# Patient Record
Sex: MALE | Race: AMERICAN INDIAN OR ALASKA NATIVE | NOT HISPANIC OR LATINO | Employment: UNEMPLOYED | URBAN - NONMETROPOLITAN AREA
[De-identification: names, ages, dates, MRNs, and addresses within clinical notes are randomized per-mention and may not be internally consistent; named-entity substitution may affect disease eponyms.]

---

## 2021-08-10 ENCOUNTER — HOSPITAL ENCOUNTER (EMERGENCY)
Facility: OTHER | Age: 31
Discharge: OTHER INSTITUTION | End: 2021-08-11
Attending: STUDENT IN AN ORGANIZED HEALTH CARE EDUCATION/TRAINING PROGRAM | Admitting: STUDENT IN AN ORGANIZED HEALTH CARE EDUCATION/TRAINING PROGRAM
Payer: COMMERCIAL

## 2021-08-10 DIAGNOSIS — F10.930 ALCOHOL WITHDRAWAL SYNDROME WITHOUT COMPLICATION (H): ICD-10-CM

## 2021-08-10 LAB
ALBUMIN SERPL-MCNC: 3.8 G/DL (ref 3.5–5.7)
ALP SERPL-CCNC: 122 U/L (ref 34–104)
ALT SERPL W P-5'-P-CCNC: 182 U/L (ref 7–52)
ANION GAP SERPL CALCULATED.3IONS-SCNC: 12 MMOL/L (ref 3–14)
AST SERPL W P-5'-P-CCNC: 143 U/L (ref 13–39)
BASOPHILS # BLD AUTO: 0.1 10E3/UL (ref 0–0.2)
BASOPHILS NFR BLD AUTO: 1 %
BILIRUB SERPL-MCNC: 0.7 MG/DL (ref 0.3–1)
BUN SERPL-MCNC: 11 MG/DL (ref 7–25)
CALCIUM SERPL-MCNC: 9.3 MG/DL (ref 8.6–10.3)
CHLORIDE BLD-SCNC: 99 MMOL/L (ref 98–107)
CO2 SERPL-SCNC: 22 MMOL/L (ref 21–31)
CREAT SERPL-MCNC: 0.84 MG/DL (ref 0.7–1.3)
EOSINOPHIL # BLD AUTO: 0.4 10E3/UL (ref 0–0.7)
EOSINOPHIL NFR BLD AUTO: 4 %
ERYTHROCYTE [DISTWIDTH] IN BLOOD BY AUTOMATED COUNT: 11.7 % (ref 10–15)
ETHANOL SERPL-MCNC: <0.01 G/DL
GFR SERPL CREATININE-BSD FRML MDRD: >90 ML/MIN/1.73M2
GLUCOSE BLD-MCNC: 106 MG/DL (ref 70–105)
HCT VFR BLD AUTO: 42.6 % (ref 40–53)
HGB BLD-MCNC: 15.3 G/DL (ref 13.3–17.7)
IMM GRANULOCYTES # BLD: 0 10E3/UL
IMM GRANULOCYTES NFR BLD: 0 %
INR PPP: 0.91 (ref 0.85–1.15)
LACTATE SERPL-SCNC: 0.9 MMOL/L (ref 0.7–2)
LIPASE SERPL-CCNC: 21 U/L (ref 11–82)
LYMPHOCYTES # BLD AUTO: 2.1 10E3/UL (ref 0.8–5.3)
LYMPHOCYTES NFR BLD AUTO: 19 %
MAGNESIUM SERPL-MCNC: 2 MG/DL (ref 1.9–2.7)
MCH RBC QN AUTO: 34 PG (ref 26.5–33)
MCHC RBC AUTO-ENTMCNC: 35.9 G/DL (ref 31.5–36.5)
MCV RBC AUTO: 95 FL (ref 78–100)
MONOCYTES # BLD AUTO: 0.6 10E3/UL (ref 0–1.3)
MONOCYTES NFR BLD AUTO: 6 %
NEUTROPHILS # BLD AUTO: 8.1 10E3/UL (ref 1.6–8.3)
NEUTROPHILS NFR BLD AUTO: 70 %
NRBC # BLD AUTO: 0 10E3/UL
NRBC BLD AUTO-RTO: 0 /100
PLATELET # BLD AUTO: 311 10E3/UL (ref 150–450)
POTASSIUM BLD-SCNC: 3.6 MMOL/L (ref 3.5–5.1)
PROT SERPL-MCNC: 7.1 G/DL (ref 6.4–8.9)
RBC # BLD AUTO: 4.5 10E6/UL (ref 4.4–5.9)
SODIUM SERPL-SCNC: 133 MMOL/L (ref 134–144)
WBC # BLD AUTO: 11.3 10E3/UL (ref 4–11)

## 2021-08-10 PROCEDURE — 96374 THER/PROPH/DIAG INJ IV PUSH: CPT | Performed by: STUDENT IN AN ORGANIZED HEALTH CARE EDUCATION/TRAINING PROGRAM

## 2021-08-10 PROCEDURE — 250N000013 HC RX MED GY IP 250 OP 250 PS 637: Performed by: STUDENT IN AN ORGANIZED HEALTH CARE EDUCATION/TRAINING PROGRAM

## 2021-08-10 PROCEDURE — 83605 ASSAY OF LACTIC ACID: CPT | Performed by: STUDENT IN AN ORGANIZED HEALTH CARE EDUCATION/TRAINING PROGRAM

## 2021-08-10 PROCEDURE — 93005 ELECTROCARDIOGRAM TRACING: CPT | Performed by: STUDENT IN AN ORGANIZED HEALTH CARE EDUCATION/TRAINING PROGRAM

## 2021-08-10 PROCEDURE — 83690 ASSAY OF LIPASE: CPT | Performed by: STUDENT IN AN ORGANIZED HEALTH CARE EDUCATION/TRAINING PROGRAM

## 2021-08-10 PROCEDURE — 36415 COLL VENOUS BLD VENIPUNCTURE: CPT | Performed by: STUDENT IN AN ORGANIZED HEALTH CARE EDUCATION/TRAINING PROGRAM

## 2021-08-10 PROCEDURE — 96375 TX/PRO/DX INJ NEW DRUG ADDON: CPT | Performed by: STUDENT IN AN ORGANIZED HEALTH CARE EDUCATION/TRAINING PROGRAM

## 2021-08-10 PROCEDURE — 99284 EMERGENCY DEPT VISIT MOD MDM: CPT | Performed by: STUDENT IN AN ORGANIZED HEALTH CARE EDUCATION/TRAINING PROGRAM

## 2021-08-10 PROCEDURE — 82040 ASSAY OF SERUM ALBUMIN: CPT | Performed by: STUDENT IN AN ORGANIZED HEALTH CARE EDUCATION/TRAINING PROGRAM

## 2021-08-10 PROCEDURE — 83735 ASSAY OF MAGNESIUM: CPT | Performed by: STUDENT IN AN ORGANIZED HEALTH CARE EDUCATION/TRAINING PROGRAM

## 2021-08-10 PROCEDURE — 93010 ELECTROCARDIOGRAM REPORT: CPT | Performed by: INTERNAL MEDICINE

## 2021-08-10 PROCEDURE — 250N000011 HC RX IP 250 OP 636: Performed by: STUDENT IN AN ORGANIZED HEALTH CARE EDUCATION/TRAINING PROGRAM

## 2021-08-10 PROCEDURE — 258N000003 HC RX IP 258 OP 636: Performed by: STUDENT IN AN ORGANIZED HEALTH CARE EDUCATION/TRAINING PROGRAM

## 2021-08-10 PROCEDURE — 85025 COMPLETE CBC W/AUTO DIFF WBC: CPT | Performed by: STUDENT IN AN ORGANIZED HEALTH CARE EDUCATION/TRAINING PROGRAM

## 2021-08-10 PROCEDURE — 85610 PROTHROMBIN TIME: CPT | Performed by: STUDENT IN AN ORGANIZED HEALTH CARE EDUCATION/TRAINING PROGRAM

## 2021-08-10 PROCEDURE — 82077 ASSAY SPEC XCP UR&BREATH IA: CPT | Performed by: STUDENT IN AN ORGANIZED HEALTH CARE EDUCATION/TRAINING PROGRAM

## 2021-08-10 PROCEDURE — 99285 EMERGENCY DEPT VISIT HI MDM: CPT | Mod: 25 | Performed by: STUDENT IN AN ORGANIZED HEALTH CARE EDUCATION/TRAINING PROGRAM

## 2021-08-10 RX ORDER — DIAZEPAM 5 MG
10 TABLET ORAL ONCE
Status: COMPLETED | OUTPATIENT
Start: 2021-08-10 | End: 2021-08-10

## 2021-08-10 RX ORDER — THIAMINE HYDROCHLORIDE 100 MG/ML
100 INJECTION, SOLUTION INTRAMUSCULAR; INTRAVENOUS ONCE
Status: COMPLETED | OUTPATIENT
Start: 2021-08-10 | End: 2021-08-10

## 2021-08-10 RX ORDER — FOLIC ACID 5 MG/ML
1 INJECTION, SOLUTION INTRAMUSCULAR; INTRAVENOUS; SUBCUTANEOUS ONCE
Status: DISCONTINUED | OUTPATIENT
Start: 2021-08-10 | End: 2021-08-10 | Stop reason: CLARIF

## 2021-08-10 RX ORDER — FOLIC ACID 1 MG/1
1 TABLET ORAL DAILY
Status: DISCONTINUED | OUTPATIENT
Start: 2021-08-11 | End: 2021-08-11 | Stop reason: HOSPADM

## 2021-08-10 RX ORDER — ONDANSETRON 2 MG/ML
4 INJECTION INTRAMUSCULAR; INTRAVENOUS ONCE
Status: COMPLETED | OUTPATIENT
Start: 2021-08-10 | End: 2021-08-10

## 2021-08-10 RX ADMIN — DIAZEPAM 10 MG: 5 TABLET ORAL at 23:40

## 2021-08-10 RX ADMIN — THIAMINE HYDROCHLORIDE 100 MG: 100 INJECTION, SOLUTION INTRAMUSCULAR; INTRAVENOUS at 23:40

## 2021-08-10 RX ADMIN — ONDANSETRON 4 MG: 2 INJECTION INTRAMUSCULAR; INTRAVENOUS at 23:40

## 2021-08-10 RX ADMIN — FOLIC ACID 1 MG: 1 TABLET ORAL at 23:41

## 2021-08-10 RX ADMIN — SODIUM CHLORIDE 1000 ML: 9 INJECTION, SOLUTION INTRAVENOUS at 23:40

## 2021-08-11 ENCOUNTER — ALLIED HEALTH/NURSE VISIT (OUTPATIENT)
Dept: FAMILY MEDICINE | Facility: OTHER | Age: 31
End: 2021-08-11
Attending: FAMILY MEDICINE
Payer: COMMERCIAL

## 2021-08-11 VITALS
WEIGHT: 200 LBS | DIASTOLIC BLOOD PRESSURE: 105 MMHG | SYSTOLIC BLOOD PRESSURE: 155 MMHG | TEMPERATURE: 98 F | HEART RATE: 90 BPM | RESPIRATION RATE: 18 BRPM | OXYGEN SATURATION: 96 %

## 2021-08-11 DIAGNOSIS — Z20.822 COVID-19 RULED OUT: Primary | ICD-10-CM

## 2021-08-11 LAB
HOLD SPECIMEN: NORMAL
HOLD SPECIMEN: NORMAL
SARS-COV-2 RNA RESP QL NAA+PROBE: NEGATIVE

## 2021-08-11 PROCEDURE — C9803 HOPD COVID-19 SPEC COLLECT: HCPCS

## 2021-08-11 PROCEDURE — U0003 INFECTIOUS AGENT DETECTION BY NUCLEIC ACID (DNA OR RNA); SEVERE ACUTE RESPIRATORY SYNDROME CORONAVIRUS 2 (SARS-COV-2) (CORONAVIRUS DISEASE [COVID-19]), AMPLIFIED PROBE TECHNIQUE, MAKING USE OF HIGH THROUGHPUT TECHNOLOGIES AS DESCRIBED BY CMS-2020-01-R: HCPCS | Mod: ZL

## 2021-08-11 NOTE — PROGRESS NOTES
Patient swabbed for COVID-19 testing.  Alisia Mccoy LPN on 8/11/2021 at 11:30 AM    [facility]

## 2021-08-11 NOTE — ED PROVIDER NOTES
History     Chief Complaint   Patient presents with     Hypertension     Headache     Nausea     HPI    Nick Lu is a 31 year old male with etoh use disorder, history of alcohol withdrawal seizures who presents with high blood pressure.  Patient states that he is currently at detox and that staff at the detox center are concerned about his high blood pressure which did not seem to get better with Ativan and they are concerned about second causes, per patient.  The patient does not feel like he is in alcohol withdrawal, he has been in alcohol withdrawal in the past and had alcohol withdrawal seizures and certainly this does not feel similar to those severe events in the past.  He states he may have mild alcohol withdrawal, but the Ativan has been helping at the facility.  He denies any fevers, chills, chest pain, shortness breath, sore throat, cough, pain with writing, myalgias, arthralgias, or blurry vision.  He does have a mild headache, diffuse and slowly progressive onset in nature.  He does smoke marijuana on occasion, there is no new marijuana or synthetic or adulterated marijuana. Denies SI/HI.    He states that he has treatment starting this Thursday for his ETOH use in [place], [place].    Allergies:  No Known Allergies    Problem List:    There are no problems to display for this patient.       Past Medical History:    History reviewed. No pertinent past medical history.    Past Surgical History:    History reviewed. No pertinent surgical history.    Family History:    History reviewed. No pertinent family history.    Social History:  Marital Status:  Single [1]  Social History     Tobacco Use     Smoking status: Current Every Day Smoker     Packs/day: 1.00     Types: Cigarettes     Smokeless tobacco: Never Used   Substance Use Topics     Alcohol use: Yes     Comment: last sunday night     Drug use: None        Medications:    No current outpatient medications on file.        Review of Systems  All systems  reviewed and other than pertinent positives and negatives in HPI, all other systems are negaive      Physical Exam   BP: (!) 149/104  Pulse: (!) 121  Temp: 98  F (36.7  C)  Resp: 18  Weight: 90.7 kg (200 lb)  SpO2: 96 %    Vitals: BP (!) 157/108   Pulse (!) 121   Temp 98  F (36.7  C) (Tympanic)   Resp 18   Wt 90.7 kg (200 lb)   SpO2 96%   Constitutional: awake, NAD  Eyes: No conjunctival injection and normal lids, PERRL, EOMI  ENT: external nose and ears atraumatic  - dry oral mucous membranes  - no tongue fasciulations  Neck: Symmetric, trachea midline, Supple  CV: RRR, no murmurs appreciated.  Pulm: Unlabored respiratory effort, good air movement, CTAB, no w/c/r appreciated.  GI: Soft, nontender, nondistended.  No rebound or gaurding.  MSK: No deformities.  No cyanosis.  Neuro: AOx4, normal speech, following commands, grossly symmetric strength in all extremities.  Cranial nerves III-XII grossly intact.   - no tremors  Skin: warm & dry, no rashes or lesions  Psych: Appropriate mood & affect.      Assessments & Plan (with Medical Decision Making)     Patient is a 31-year-old male with a past medical history of severe alcohol use disorder, alcohol withdrawal, alcohol withdrawal seizures, marijuana use who presents with high blood pressure.  He is currently in a detox facility receiving benzodiazepine taper treatment, concerned about his high blood pressures at the facility despite giving Ativan.  He reports smoking marijuana, but denies any other illicit drugs.  He states he has had some loose stools, nonbloody, but denies any URI symptoms, fevers, chills, sore throat,hallucinations, or  suspicious foods.    ED Course  ED Course as of Aug 11 0015   Tue Aug 10, 2021   2220 BP(!): 149/104   2220 Temp: 98  F (36.7  C)   2220 Pulse(!): 121   2220 Resp: 18   2220 SpO2: 96 %   2319 WBC(!): 11.3   2319 Hemoglobin: 15.3   2319 Platelet Count: 311   2342 INR: 0.91   2342 ALT(!): 182   2342 AST(!): 143   2342 Alkaline  Phosphatase(!): 122   2342 Glucose(!): 106   2342 Creatinine: 0.84   2342 Chloride: 99   2342 Sodium(!): 133   2342 Bilirubin Total: 0.7   2343 CT abdomen pelvis at OSH 8/9/2021    IMPRESSION:   1. No acute findings in the abdomen or pelvis.   2.  Diffuse fatty infiltration of the liver. Correlate clinically as this may   predispose patient to developing steatohepatitis and eventual cirrhosis.   3. Fatty infiltration of the colonic wall involving the cecum through hepatic   flexure.  Nonspecific but is associated with prior infection or inflammation   and can also be seen in the setting of quiescent inflammatory bowel disease.   4. Mild right basilar atelectasis.        2343 OSH labs  - ,   - alkphos 149      Wed Aug 11, 2021   0009 Ethanol g/dL: <0.01   0015 BP(!): 152/111   0015 Pulse: 99       /104. . Temp 98F.  RR 18.  SpO2 96%. Secondary exam revealed slightly anxious but nontoxic-appearing male.  He has nontender abdomen, no tongue fasciculations or tremors of his hands.  He does have dry oral mucous membranes.  States that he does not feel like he is going to withdrawal at this time, but it could be possible that he is in mild withdrawal.  Does endorse smoking marijuana denies any illicit drug use.  He was given fluids, Valium, Zofran, thiamine and folate.  His white blood cell count 11.3.  Hemoglobin 15.3.  AST/ALT elevated at 143/182 but decreased from yesterday which is good.  INR 0.91.  Alk phos 122, decreased from yesterday at 149.  Alcohol level <0.01.  Sodium 133.  Potassium 3.6.  Creatinine 0.84.  Magnesium 2.0.  Lipase 21.  He was observed for period time and had improvement of symptoms.  My concern for sinister pathology such as small bowel obstruction, return to radius, volvulus, mesenteric ischemia, intracranial pathology such as ICH, or subarachnoid hemorrhage are low.  His headache was slowly progressive in nature.  Certainly his symptoms are consistent with mild to  moderate alcohol withdrawal today improved with Valium 10 mg. Certainly, he does have some mild diarrhea which could be contributing to his tachycardia as well. Per EMR, his covid19 test was negative yesterday and thus I do not believe it indicated for repeating testing today. Tachycardia improved to 99. Likely combination of hypovolemia and mild etoh withdrawal.   Given this he was told encouraged to go back to the detox facility which she was amenable to.  All questions answered.     Per chart review, he was last seen yesterday at [facility] found to have ETOH withdrawal given ativan, zofran and fluids.  CT abdomen completed then to rule out obstruction which was negative.     Plan  - NS 1L  - valium 10 mg PO  - zofran 4 mg IV  - thiamine 100 mg IV  - folate 1 mg  - stable to return to detox  - Patient was told to follow up with their PCP in 2-3 days.  - Return to the emergency department if you have any worsening or concerning symptoms, or call 911.    Impression  Marijuana Use  Severe alcohol use disorder  Alcohol withdrawal syndrome, mild  Diarrhea, mild    Disposition  detox    I have reviewed the nursing notes.    I have reviewed the findings, diagnosis, plan and need for follow up with the patient.  Nikolas Badillo MD  Emergency Medicine   8/10/2021   [facility]     Yoni Badillo MD  08/11/21 0012       Yoni Badillo MD  08/11/21 0015

## 2021-08-11 NOTE — ED TRIAGE NOTES
ED Nursing Triage Note (General)   ________________________________    Nick Lu is a 31 year old Male that presents to triage private car with history of hypertension, headache and tremors reported by patient   Significant symptoms had onset 2 hour(s) ago. Patient is currently at detox as of Monday AM for alcohol. Found to be hypertensive upon check at detox. Feels mild tremors and headache. Patient would like to return to detox when able.   BP (!) 149/104   Pulse (!) 121   Temp 98  F (36.7  C) (Tympanic)   Resp 18   Wt 90.7 kg (200 lb)   SpO2 96%   Patient appears alert , in no acute distress  GCS Eye Opening = 4=Spontaneous  Airway: intact  Breathing noted as Normal  Circulation Normal  Skin:  Normal  Action taken: [place] 10      PRE HOSPITAL PRIOR LIVING SITUATION Alone

## 2021-08-13 LAB
ATRIAL RATE - MUSE: 99 BPM
DIASTOLIC BLOOD PRESSURE - MUSE: NORMAL MMHG
INTERPRETATION ECG - MUSE: NORMAL
P AXIS - MUSE: 21 DEGREES
PR INTERVAL - MUSE: 130 MS
QRS DURATION - MUSE: 80 MS
QT - MUSE: 350 MS
QTC - MUSE: 449 MS
R AXIS - MUSE: -11 DEGREES
SYSTOLIC BLOOD PRESSURE - MUSE: NORMAL MMHG
T AXIS - MUSE: 12 DEGREES
VENTRICULAR RATE- MUSE: 99 BPM

## 2023-01-11 ENCOUNTER — HOSPITAL ENCOUNTER (EMERGENCY)
Facility: OTHER | Age: 33
Discharge: OTHER INSTITUTION | End: 2023-01-11
Attending: FAMILY MEDICINE | Admitting: FAMILY MEDICINE
Payer: COMMERCIAL

## 2023-01-11 VITALS
DIASTOLIC BLOOD PRESSURE: 93 MMHG | OXYGEN SATURATION: 97 % | SYSTOLIC BLOOD PRESSURE: 131 MMHG | RESPIRATION RATE: 12 BRPM | HEART RATE: 105 BPM | TEMPERATURE: 97.5 F

## 2023-01-11 DIAGNOSIS — R10.13 ABDOMINAL PAIN, EPIGASTRIC: ICD-10-CM

## 2023-01-11 DIAGNOSIS — F10.221 ACUTE ALCOHOL INTOXICATION WITH ALCOHOLISM WITH DELIRIUM (H): Primary | ICD-10-CM

## 2023-01-11 PROBLEM — B37.81 CANDIDA ESOPHAGITIS (H): Status: ACTIVE | Noted: 2020-06-23

## 2023-01-11 PROBLEM — E87.1 HYPONATREMIA: Status: ACTIVE | Noted: 2020-06-23

## 2023-01-11 PROBLEM — S83.211A BUCKET HANDLE TEAR OF MEDIAL MENISCUS OF RIGHT KNEE: Status: ACTIVE | Noted: 2017-11-14

## 2023-01-11 PROBLEM — R44.1 VISUAL HALLUCINATION: Status: ACTIVE | Noted: 2022-09-20

## 2023-01-11 PROBLEM — Z86.59 HISTORY OF SEIZURE DUE TO ALCOHOL WITHDRAWAL: Status: ACTIVE | Noted: 2022-09-20

## 2023-01-11 PROBLEM — R56.9 ALCOHOL WITHDRAWAL SEIZURE WITHOUT COMPLICATION (H): Status: ACTIVE | Noted: 2022-03-31

## 2023-01-11 PROBLEM — F10.930 ALCOHOL WITHDRAWAL SEIZURE WITHOUT COMPLICATION (H): Status: ACTIVE | Noted: 2022-03-31

## 2023-01-11 PROBLEM — K70.9 ALCOHOLIC LIVER DISEASE (H): Status: ACTIVE | Noted: 2022-03-31

## 2023-01-11 PROBLEM — K44.9 HIATAL HERNIA: Status: ACTIVE | Noted: 2020-06-16

## 2023-01-11 PROBLEM — F41.9 ANXIETY: Status: ACTIVE | Noted: 2022-04-23

## 2023-01-11 PROBLEM — Z87.898 HISTORY OF SEIZURE DUE TO ALCOHOL WITHDRAWAL: Status: ACTIVE | Noted: 2022-09-20

## 2023-01-11 PROBLEM — F10.932 ALCOHOL WITHDRAWAL SYNDROME WITH PERCEPTUAL DISTURBANCE (H): Status: ACTIVE | Noted: 2020-06-23

## 2023-01-11 PROBLEM — R79.89 ELEVATED LFTS: Status: ACTIVE | Noted: 2020-06-15

## 2023-01-11 PROBLEM — F32.9 MAJOR DEPRESSIVE DISORDER: Status: ACTIVE | Noted: 2022-04-23

## 2023-01-11 LAB
AMPHETAMINES UR QL SCN: NORMAL
ANION GAP SERPL CALCULATED.3IONS-SCNC: 16 MMOL/L (ref 7–15)
ATRIAL RATE - MUSE: 104 BPM
BARBITURATES UR QL SCN: NORMAL
BASOPHILS # BLD AUTO: 0.1 10E3/UL (ref 0–0.2)
BASOPHILS NFR BLD AUTO: 1 %
BENZODIAZ UR QL SCN: NORMAL
BUN SERPL-MCNC: 5.4 MG/DL (ref 6–20)
BZE UR QL SCN: NORMAL
CALCIUM SERPL-MCNC: 8.3 MG/DL (ref 8.6–10)
CANNABINOIDS UR QL SCN: NORMAL
CHLORIDE SERPL-SCNC: 97 MMOL/L (ref 98–107)
CREAT SERPL-MCNC: 0.72 MG/DL (ref 0.67–1.17)
DEPRECATED HCO3 PLAS-SCNC: 22 MMOL/L (ref 22–29)
DIASTOLIC BLOOD PRESSURE - MUSE: NORMAL MMHG
EOSINOPHIL # BLD AUTO: 0 10E3/UL (ref 0–0.7)
EOSINOPHIL NFR BLD AUTO: 0 %
ERYTHROCYTE [DISTWIDTH] IN BLOOD BY AUTOMATED COUNT: 14 % (ref 10–15)
ETHANOL SERPL-MCNC: 0.22 G/DL
GFR SERPL CREATININE-BSD FRML MDRD: >90 ML/MIN/1.73M2
GLUCOSE SERPL-MCNC: 113 MG/DL (ref 70–99)
HCT VFR BLD AUTO: 45.7 % (ref 40–53)
HGB BLD-MCNC: 16 G/DL (ref 13.3–17.7)
IMM GRANULOCYTES # BLD: 0 10E3/UL
IMM GRANULOCYTES NFR BLD: 0 %
INTERPRETATION ECG - MUSE: NORMAL
LIPASE SERPL-CCNC: 31 U/L (ref 13–60)
LYMPHOCYTES # BLD AUTO: 2.5 10E3/UL (ref 0.8–5.3)
LYMPHOCYTES NFR BLD AUTO: 27 %
MCH RBC QN AUTO: 31.3 PG (ref 26.5–33)
MCHC RBC AUTO-ENTMCNC: 35 G/DL (ref 31.5–36.5)
MCV RBC AUTO: 89 FL (ref 78–100)
MONOCYTES # BLD AUTO: 0.5 10E3/UL (ref 0–1.3)
MONOCYTES NFR BLD AUTO: 5 %
NEUTROPHILS # BLD AUTO: 6.4 10E3/UL (ref 1.6–8.3)
NEUTROPHILS NFR BLD AUTO: 67 %
NRBC # BLD AUTO: 0 10E3/UL
NRBC BLD AUTO-RTO: 0 /100
OPIATES UR QL SCN: NORMAL
P AXIS - MUSE: 30 DEGREES
PCP QUAL URINE (ROCHE): NORMAL
PLATELET # BLD AUTO: 338 10E3/UL (ref 150–450)
POTASSIUM SERPL-SCNC: 4 MMOL/L (ref 3.4–5.3)
PR INTERVAL - MUSE: 140 MS
QRS DURATION - MUSE: 88 MS
QT - MUSE: 340 MS
QTC - MUSE: 447 MS
R AXIS - MUSE: 5 DEGREES
RBC # BLD AUTO: 5.12 10E6/UL (ref 4.4–5.9)
SODIUM SERPL-SCNC: 135 MMOL/L (ref 136–145)
SYSTOLIC BLOOD PRESSURE - MUSE: NORMAL MMHG
T AXIS - MUSE: 29 DEGREES
TROPONIN T SERPL HS-MCNC: 9 NG/L
VENTRICULAR RATE- MUSE: 104 BPM
WBC # BLD AUTO: 9.5 10E3/UL (ref 4–11)

## 2023-01-11 PROCEDURE — 250N000013 HC RX MED GY IP 250 OP 250 PS 637: Performed by: STUDENT IN AN ORGANIZED HEALTH CARE EDUCATION/TRAINING PROGRAM

## 2023-01-11 PROCEDURE — 258N000003 HC RX IP 258 OP 636: Performed by: FAMILY MEDICINE

## 2023-01-11 PROCEDURE — 250N000011 HC RX IP 250 OP 636: Performed by: STUDENT IN AN ORGANIZED HEALTH CARE EDUCATION/TRAINING PROGRAM

## 2023-01-11 PROCEDURE — 250N000009 HC RX 250: Performed by: FAMILY MEDICINE

## 2023-01-11 PROCEDURE — 250N000011 HC RX IP 250 OP 636: Performed by: FAMILY MEDICINE

## 2023-01-11 PROCEDURE — 99285 EMERGENCY DEPT VISIT HI MDM: CPT | Mod: 25 | Performed by: FAMILY MEDICINE

## 2023-01-11 PROCEDURE — 96374 THER/PROPH/DIAG INJ IV PUSH: CPT | Performed by: FAMILY MEDICINE

## 2023-01-11 PROCEDURE — 80307 DRUG TEST PRSMV CHEM ANLYZR: CPT | Performed by: STUDENT IN AN ORGANIZED HEALTH CARE EDUCATION/TRAINING PROGRAM

## 2023-01-11 PROCEDURE — 96361 HYDRATE IV INFUSION ADD-ON: CPT | Performed by: FAMILY MEDICINE

## 2023-01-11 PROCEDURE — 80048 BASIC METABOLIC PNL TOTAL CA: CPT | Performed by: FAMILY MEDICINE

## 2023-01-11 PROCEDURE — 36415 COLL VENOUS BLD VENIPUNCTURE: CPT | Performed by: FAMILY MEDICINE

## 2023-01-11 PROCEDURE — 250N000013 HC RX MED GY IP 250 OP 250 PS 637: Performed by: FAMILY MEDICINE

## 2023-01-11 PROCEDURE — 85025 COMPLETE CBC W/AUTO DIFF WBC: CPT | Performed by: FAMILY MEDICINE

## 2023-01-11 PROCEDURE — 93005 ELECTROCARDIOGRAM TRACING: CPT | Performed by: FAMILY MEDICINE

## 2023-01-11 PROCEDURE — 84484 ASSAY OF TROPONIN QUANT: CPT | Performed by: FAMILY MEDICINE

## 2023-01-11 PROCEDURE — 93010 ELECTROCARDIOGRAM REPORT: CPT | Performed by: STUDENT IN AN ORGANIZED HEALTH CARE EDUCATION/TRAINING PROGRAM

## 2023-01-11 PROCEDURE — 82077 ASSAY SPEC XCP UR&BREATH IA: CPT | Performed by: STUDENT IN AN ORGANIZED HEALTH CARE EDUCATION/TRAINING PROGRAM

## 2023-01-11 PROCEDURE — 83690 ASSAY OF LIPASE: CPT | Performed by: FAMILY MEDICINE

## 2023-01-11 PROCEDURE — 99284 EMERGENCY DEPT VISIT MOD MDM: CPT | Performed by: FAMILY MEDICINE

## 2023-01-11 RX ORDER — OMEPRAZOLE 10 MG/1
20 CAPSULE, DELAYED RELEASE ORAL DAILY
COMMUNITY
End: 2023-01-11

## 2023-01-11 RX ORDER — LIDOCAINE HYDROCHLORIDE 20 MG/ML
15 SOLUTION OROPHARYNGEAL ONCE
Status: COMPLETED | OUTPATIENT
Start: 2023-01-11 | End: 2023-01-11

## 2023-01-11 RX ORDER — SODIUM CHLORIDE 9 MG/ML
INJECTION, SOLUTION INTRAVENOUS CONTINUOUS
Status: DISCONTINUED | OUTPATIENT
Start: 2023-01-11 | End: 2023-01-11 | Stop reason: HOSPADM

## 2023-01-11 RX ORDER — OMEPRAZOLE 10 MG/1
20 CAPSULE, DELAYED RELEASE ORAL 2 TIMES DAILY
Refills: 0 | COMMUNITY
Start: 2023-01-11

## 2023-01-11 RX ORDER — LORAZEPAM 0.5 MG/1
0.5 TABLET ORAL ONCE
Status: COMPLETED | OUTPATIENT
Start: 2023-01-11 | End: 2023-01-11

## 2023-01-11 RX ORDER — FAMOTIDINE 20 MG/1
20 TABLET, FILM COATED ORAL ONCE
Status: COMPLETED | OUTPATIENT
Start: 2023-01-11 | End: 2023-01-11

## 2023-01-11 RX ORDER — MAGNESIUM HYDROXIDE/ALUMINUM HYDROXICE/SIMETHICONE 120; 1200; 1200 MG/30ML; MG/30ML; MG/30ML
15 SUSPENSION ORAL ONCE
Status: COMPLETED | OUTPATIENT
Start: 2023-01-11 | End: 2023-01-11

## 2023-01-11 RX ORDER — ONDANSETRON 4 MG/1
4 TABLET, ORALLY DISINTEGRATING ORAL ONCE
Status: COMPLETED | OUTPATIENT
Start: 2023-01-11 | End: 2023-01-11

## 2023-01-11 RX ORDER — SUCRALFATE 1 G/1
1 TABLET ORAL 4 TIMES DAILY
COMMUNITY

## 2023-01-11 RX ORDER — LORAZEPAM 2 MG/ML
2 INJECTION INTRAMUSCULAR ONCE
Status: COMPLETED | OUTPATIENT
Start: 2023-01-11 | End: 2023-01-11

## 2023-01-11 RX ADMIN — LORAZEPAM 0.5 MG: 0.5 TABLET ORAL at 09:37

## 2023-01-11 RX ADMIN — ALUMINUM HYDROXIDE, MAGNESIUM HYDROXIDE, AND SIMETHICONE 15 ML: 200; 200; 20 SUSPENSION ORAL at 05:40

## 2023-01-11 RX ADMIN — LIDOCAINE HYDROCHLORIDE 15 ML: 20 SOLUTION ORAL at 05:40

## 2023-01-11 RX ADMIN — SODIUM CHLORIDE 1000 ML: 9 INJECTION, SOLUTION INTRAVENOUS at 06:46

## 2023-01-11 RX ADMIN — FAMOTIDINE 20 MG: 20 TABLET, FILM COATED ORAL at 05:39

## 2023-01-11 RX ADMIN — ONDANSETRON 4 MG: 4 TABLET, ORALLY DISINTEGRATING ORAL at 09:37

## 2023-01-11 RX ADMIN — LORAZEPAM 2 MG: 2 INJECTION INTRAMUSCULAR; INTRAVENOUS at 06:43

## 2023-01-11 ASSESSMENT — ENCOUNTER SYMPTOMS
PALPITATIONS: 0
CHILLS: 0
ABDOMINAL PAIN: 1

## 2023-01-11 ASSESSMENT — ACTIVITIES OF DAILY LIVING (ADL)
ADLS_ACUITY_SCORE: 35
ADLS_ACUITY_SCORE: 35

## 2023-01-11 NOTE — ED PROVIDER NOTES
Received signout at shift change. Patient presented from Detox for alcohol intoxication and abdominal pain burining in the epigastrium upward. Originates in epigastrium and proceeds upward. Episodes of emesis and dry heaving. History of acid reflux on omeprazole and sucralfate daily.     Labs with borderline low sodium. Normal lipase and troponin. CBC unremarkable. Add on ETOH level and Utox.     Ddx: alcohol intoxication ACS unlikely with negative troponin and EKG. Lipase negative but could be chronic. Hemodynamically stable with no blood in emesis so doubt ramila casas tear. Certainly with alcoholism he could have chronic gastritis.     Patient given GI cocktail which improved his pain short-term.  Ativan also helped.  On reassessment at 0900 patient is pleasant alert and having no hallucinations.  He still has some GI upset and has had no blood in his stool or his emesis.  We discussed him pursuing treatment for alcoholism again and he is in agreement with this.  Would like to quit drinking alcohol.  He will increase his PPI to twice daily and he will be transferred back to detox for ongoing monitoring of alcohol withdrawal.  Patient has urinated and is eating some toast prior to transfer.  He has been adequately hydrated prior to transfer and is taking good p.o. liquids.  Most recent CIWA score was negative.  He was administered a total of 2 g of IV lorazepam while in the ED.      ICD-10-CM    1. Acute alcohol intoxication with alcoholism with delirium (H)  F10.221       2. Abdominal pain, epigastric  R10.13         Bobo Bhakta MD on 1/11/2023 at 9:28 AM

## 2023-01-11 NOTE — ED PROVIDER NOTES
History     Chief Complaint   Patient presents with     Chest Pain     HPI  Nick Lu is a 32 year old male who presents to the emergency department from detox with pain in his upper abdomen.  History of pancreatitis and alcoholism.  No recent fevers.    Reviewed nurses notes below, similar history is related to me.  Patient presents to ER with CO chest pain from detox. Pain began 2 hours ago. Originates in epigastrium and proceeds upward. Patient burping multiple times during triage. Multiple emesis and dry heaving. Patient has history of acid reflux, omeprazole and sucralfate daily  Allergies:  No Known Allergies    Problem List:    There are no problems to display for this patient.       Past Medical History:    No past medical history on file.    Past Surgical History:    No past surgical history on file.    Family History:    No family history on file.    Social History:  Marital Status:  Single [1]  Social History     Tobacco Use     Smoking status: Every Day     Packs/day: 1.00     Types: Cigarettes     Smokeless tobacco: Never   Substance Use Topics     Alcohol use: Yes     Comment: last sunday night        Medications:    omeprazole (PRILOSEC) 10 MG DR capsule  sucralfate (CARAFATE) 1 GM tablet          Review of Systems   Constitutional: Negative for chills.   Cardiovascular: Positive for chest pain. Negative for palpitations and leg swelling.   Gastrointestinal: Positive for abdominal pain.       Physical Exam   BP: (!) 159/100  Pulse: 109  Temp: 97.5  F (36.4  C)  Resp: 20      Physical Exam  Vitals and nursing note reviewed.   Constitutional:       General: He is not in acute distress.     Appearance: He is not diaphoretic.   HENT:      Head: Atraumatic.   Eyes:      General: No scleral icterus.     Pupils: Pupils are equal, round, and reactive to light.   Cardiovascular:      Rate and Rhythm: Tachycardia present.      Heart sounds: Normal heart sounds.   Pulmonary:      Effort: No respiratory  distress.      Breath sounds: Normal breath sounds.   Abdominal:      General: Bowel sounds are normal.      Palpations: Abdomen is soft.      Tenderness: There is abdominal tenderness.   Musculoskeletal:         General: No tenderness.   Skin:     General: Skin is warm.      Findings: No rash.         ED Course                Results for orders placed or performed during the hospital encounter of 01/11/23 (from the past 24 hour(s))   CBC with platelets differential    Narrative    The following orders were created for panel order CBC with platelets differential.  Procedure                               Abnormality         Status                     ---------                               -----------         ------                     CBC with platelets and d...[[id number]]                      Final result                 Please view results for these tests on the individual orders.   Basic metabolic panel   Result Value Ref Range    Sodium 135 (L) 136 - 145 mmol/L    Potassium 4.0 3.4 - 5.3 mmol/L    Chloride 97 (L) 98 - 107 mmol/L    Carbon Dioxide (CO2) 22 22 - 29 mmol/L    Anion Gap 16 (H) 7 - 15 mmol/L    Urea Nitrogen 5.4 (L) 6.0 - 20.0 mg/dL    Creatinine 0.72 0.67 - 1.17 mg/dL    Calcium 8.3 (L) 8.6 - 10.0 mg/dL    Glucose 113 (H) 70 - 99 mg/dL    GFR Estimate >90 >60 mL/min/1.73m2   Lipase   Result Value Ref Range    Lipase 31 13 - 60 U/L   Troponin T, High Sensitivity   Result Value Ref Range    Troponin T, High Sensitivity 9 <=22 ng/L   CBC with platelets and differential   Result Value Ref Range    WBC Count 9.5 4.0 - 11.0 10e3/uL    RBC Count 5.12 4.40 - 5.90 10e6/uL    Hemoglobin 16.0 13.3 - 17.7 g/dL    Hematocrit 45.7 40.0 - 53.0 %    MCV 89 78 - 100 fL    MCH 31.3 26.5 - 33.0 pg    MCHC 35.0 31.5 - 36.5 g/dL    RDW 14.0 10.0 - 15.0 %    Platelet Count 338 150 - 450 10e3/uL    % Neutrophils 67 %    % Lymphocytes 27 %    % Monocytes 5 %    % Eosinophils 0 %    % Basophils 1 %    % Immature  Granulocytes 0 %    NRBCs per 100 WBC 0 <1 /100    Absolute Neutrophils 6.4 1.6 - 8.3 10e3/uL    Absolute Lymphocytes 2.5 0.8 - 5.3 10e3/uL    Absolute Monocytes 0.5 0.0 - 1.3 10e3/uL    Absolute Eosinophils 0.0 0.0 - 0.7 10e3/uL    Absolute Basophils 0.1 0.0 - 0.2 10e3/uL    Absolute Immature Granulocytes 0.0 <=0.4 10e3/uL    Absolute NRBCs 0.0 10e3/uL       Medications   0.9% sodium chloride BOLUS (has no administration in time range)     Followed by   sodium chloride 0.9% infusion (has no administration in time range)   LORazepam (ATIVAN) injection 2 mg (has no administration in time range)   alum & mag hydroxide-simethicone (MAALOX) suspension 15 mL (15 mLs Oral Given 1/11/23 2540)     And   lidocaine (viscous) (XYLOCAINE) 2 % solution 15 mL (15 mLs Mouth/Throat Given 1/11/23 8940)   famotidine (PEPCID) tablet 20 mg (20 mg Oral Given 1/11/23 7739)       Assessments & Plan (with Medical Decision Making)     I have reviewed the nursing notes.    I have reviewed the findings, diagnosis, plan and need for follow up with the patient.  Presenting from detox for alcohol abuse with epigastric pain: Differential diagnosis includes pancreatitis, ACS, GERD, gastritis, esophageal spasm among others.  Labs pending     6:41 AM--labs reassuring, patient continuing to get more tachycardic here in the ED.  Patient appears to be getting more confused as well, continues to come out of his room repeatedly.  We will place him on CIWA protocol, Ativan and fluids ordered.  Patient not clinical stable at time of shift change, please see dayshift note for final disposition.    Differential diagnosis of course includes acute intoxication, alcohol withdrawal with delirium.    New Prescriptions    No medications on file       Final diagnoses:   Alcohol intoxication with delirium (H)       1/11/2023   [facility]     Maximus Dodge MD  01/11/23 4484       Maximus Dodge MD  01/11/23 1550

## 2023-01-11 NOTE — ED TRIAGE NOTES
Patient presents to ER with CO chest pain from detox. Pain began 2 hours ago. Originates in epigastrium and proceeds upward. Patient burping multiple times during triage. Multiple emesis and dry heaving. Patient has history of acid reflux, omeprazole and sucralfate daily. BP (!) 159/100   Pulse 109   Temp 97.5  F (36.4  C) (Tympanic)   Resp 20        Triage Assessment     Row Name 01/11/23 0519       Triage Assessment (Adult)    Airway WDL WDL       Respiratory WDL    Respiratory WDL WDL       Skin Circulation/Temperature WDL    Skin Circulation/Temperature WDL WDL       Cardiac WDL    Cardiac WDL WDL       Peripheral/Neurovascular WDL    Peripheral Neurovascular WDL WDL       Cognitive/Neuro/Behavioral WDL    Cognitive/Neuro/Behavioral WDL WDL

## (undated) RX ORDER — FAMOTIDINE 20 MG/1
TABLET, FILM COATED ORAL
Status: DISPENSED
Start: 2023-01-11

## (undated) RX ORDER — THIAMINE HYDROCHLORIDE 100 MG/ML
INJECTION, SOLUTION INTRAMUSCULAR; INTRAVENOUS
Status: DISPENSED
Start: 2021-08-10

## (undated) RX ORDER — MAGNESIUM HYDROXIDE/ALUMINUM HYDROXICE/SIMETHICONE 120; 1200; 1200 MG/30ML; MG/30ML; MG/30ML
SUSPENSION ORAL
Status: DISPENSED
Start: 2023-01-11

## (undated) RX ORDER — LORAZEPAM 0.5 MG/1
TABLET ORAL
Status: DISPENSED
Start: 2023-01-11

## (undated) RX ORDER — DIAZEPAM 5 MG
TABLET ORAL
Status: DISPENSED
Start: 2021-08-10

## (undated) RX ORDER — LORAZEPAM 2 MG/ML
INJECTION INTRAMUSCULAR
Status: DISPENSED
Start: 2023-01-11

## (undated) RX ORDER — LIDOCAINE HYDROCHLORIDE 20 MG/ML
SOLUTION OROPHARYNGEAL
Status: DISPENSED
Start: 2023-01-11

## (undated) RX ORDER — ONDANSETRON 4 MG/1
TABLET, ORALLY DISINTEGRATING ORAL
Status: DISPENSED
Start: 2023-01-11

## (undated) RX ORDER — ONDANSETRON 2 MG/ML
INJECTION INTRAMUSCULAR; INTRAVENOUS
Status: DISPENSED
Start: 2021-08-10

## (undated) RX ORDER — SODIUM CHLORIDE 9 MG/ML
INJECTION, SOLUTION INTRAVENOUS
Status: DISPENSED
Start: 2021-08-10

## (undated) RX ORDER — FOLIC ACID 1 MG/1
TABLET ORAL
Status: DISPENSED
Start: 2021-08-10